# Patient Record
Sex: FEMALE | Race: WHITE | NOT HISPANIC OR LATINO | Employment: FULL TIME | URBAN - METROPOLITAN AREA
[De-identification: names, ages, dates, MRNs, and addresses within clinical notes are randomized per-mention and may not be internally consistent; named-entity substitution may affect disease eponyms.]

---

## 2022-10-03 ENCOUNTER — APPOINTMENT (OUTPATIENT)
Dept: CT IMAGING | Facility: CLINIC | Age: 22
End: 2022-10-03
Attending: EMERGENCY MEDICINE
Payer: COMMERCIAL

## 2022-10-03 ENCOUNTER — HOSPITAL ENCOUNTER (EMERGENCY)
Facility: CLINIC | Age: 22
Discharge: HOME | End: 2022-10-03
Attending: NURSE PRACTITIONER | Admitting: NURSE PRACTITIONER
Payer: COMMERCIAL

## 2022-10-03 VITALS
HEART RATE: 75 BPM | WEIGHT: 176 LBS | SYSTOLIC BLOOD PRESSURE: 151 MMHG | RESPIRATION RATE: 16 BRPM | OXYGEN SATURATION: 100 % | HEIGHT: 65 IN | DIASTOLIC BLOOD PRESSURE: 80 MMHG | BODY MASS INDEX: 29.32 KG/M2 | TEMPERATURE: 99 F

## 2022-10-03 DIAGNOSIS — J02.9 PHARYNGITIS: ICD-10-CM

## 2022-10-03 LAB
ALBUMIN SERPL-MCNC: 2.8 G/DL (ref 3.4–5)
ALP SERPL-CCNC: 55 U/L (ref 40–150)
ALT SERPL W P-5'-P-CCNC: 18 U/L (ref 0–50)
ANION GAP SERPL CALCULATED.3IONS-SCNC: 5 MMOL/L (ref 3–14)
AST SERPL W P-5'-P-CCNC: 12 U/L (ref 0–45)
BASOPHILS # BLD AUTO: 0 10E3/UL (ref 0–0.2)
BASOPHILS NFR BLD AUTO: 0 %
BILIRUB SERPL-MCNC: 0.3 MG/DL (ref 0.2–1.3)
BUN SERPL-MCNC: 10 MG/DL (ref 7–30)
CALCIUM SERPL-MCNC: 8.4 MG/DL (ref 8.5–10.1)
CHLORIDE BLD-SCNC: 110 MMOL/L (ref 94–109)
CO2 SERPL-SCNC: 25 MMOL/L (ref 20–32)
CREAT SERPL-MCNC: 0.56 MG/DL (ref 0.52–1.04)
DEPRECATED S PYO AG THROAT QL EIA: NEGATIVE
EOSINOPHIL # BLD AUTO: 0.2 10E3/UL (ref 0–0.7)
EOSINOPHIL NFR BLD AUTO: 2 %
ERYTHROCYTE [DISTWIDTH] IN BLOOD BY AUTOMATED COUNT: 12.9 % (ref 10–15)
FLUAV RNA SPEC QL NAA+PROBE: NEGATIVE
FLUBV RNA RESP QL NAA+PROBE: NEGATIVE
GFR SERPL CREATININE-BSD FRML MDRD: >90 ML/MIN/1.73M2
GLUCOSE BLD-MCNC: 73 MG/DL (ref 70–99)
GROUP A STREP BY PCR: NOT DETECTED
HCT VFR BLD AUTO: 41 % (ref 35–47)
HGB BLD-MCNC: 13 G/DL (ref 11.7–15.7)
IMM GRANULOCYTES # BLD: 0.1 10E3/UL
IMM GRANULOCYTES NFR BLD: 1 %
LYMPHOCYTES # BLD AUTO: 2.3 10E3/UL (ref 0.8–5.3)
LYMPHOCYTES NFR BLD AUTO: 22 %
MCH RBC QN AUTO: 28.1 PG (ref 26.5–33)
MCHC RBC AUTO-ENTMCNC: 31.7 G/DL (ref 31.5–36.5)
MCV RBC AUTO: 89 FL (ref 78–100)
MONOCYTES # BLD AUTO: 0.9 10E3/UL (ref 0–1.3)
MONOCYTES NFR BLD AUTO: 8 %
MONOCYTES NFR BLD AUTO: NEGATIVE %
NEUTROPHILS # BLD AUTO: 7.1 10E3/UL (ref 1.6–8.3)
NEUTROPHILS NFR BLD AUTO: 67 %
NRBC # BLD AUTO: 0 10E3/UL
NRBC BLD AUTO-RTO: 0 /100
PLATELET # BLD AUTO: 297 10E3/UL (ref 150–450)
POTASSIUM BLD-SCNC: 3.4 MMOL/L (ref 3.4–5.3)
PROT SERPL-MCNC: 6.5 G/DL (ref 6.8–8.8)
RBC # BLD AUTO: 4.63 10E6/UL (ref 3.8–5.2)
RSV RNA SPEC NAA+PROBE: NEGATIVE
SARS-COV-2 RNA RESP QL NAA+PROBE: NEGATIVE
SODIUM SERPL-SCNC: 140 MMOL/L (ref 133–144)
WBC # BLD AUTO: 10.5 10E3/UL (ref 4–11)

## 2022-10-03 PROCEDURE — C9803 HOPD COVID-19 SPEC COLLECT: HCPCS

## 2022-10-03 PROCEDURE — 250N000011 HC RX IP 250 OP 636: Performed by: NURSE PRACTITIONER

## 2022-10-03 PROCEDURE — 70491 CT SOFT TISSUE NECK W/DYE: CPT

## 2022-10-03 PROCEDURE — 87651 STREP A DNA AMP PROBE: CPT | Performed by: EMERGENCY MEDICINE

## 2022-10-03 PROCEDURE — 36415 COLL VENOUS BLD VENIPUNCTURE: CPT | Performed by: EMERGENCY MEDICINE

## 2022-10-03 PROCEDURE — 85004 AUTOMATED DIFF WBC COUNT: CPT | Performed by: EMERGENCY MEDICINE

## 2022-10-03 PROCEDURE — 99285 EMERGENCY DEPT VISIT HI MDM: CPT | Mod: 25

## 2022-10-03 PROCEDURE — 250N000009 HC RX 250: Performed by: NURSE PRACTITIONER

## 2022-10-03 PROCEDURE — 80053 COMPREHEN METABOLIC PANEL: CPT | Performed by: EMERGENCY MEDICINE

## 2022-10-03 PROCEDURE — 96361 HYDRATE IV INFUSION ADD-ON: CPT

## 2022-10-03 PROCEDURE — 87637 SARSCOV2&INF A&B&RSV AMP PRB: CPT | Performed by: EMERGENCY MEDICINE

## 2022-10-03 PROCEDURE — 96360 HYDRATION IV INFUSION INIT: CPT | Mod: 59

## 2022-10-03 PROCEDURE — 258N000003 HC RX IP 258 OP 636: Performed by: EMERGENCY MEDICINE

## 2022-10-03 PROCEDURE — 86308 HETEROPHILE ANTIBODY SCREEN: CPT | Performed by: EMERGENCY MEDICINE

## 2022-10-03 RX ORDER — SODIUM CHLORIDE 9 MG/ML
INJECTION, SOLUTION INTRAVENOUS CONTINUOUS
Status: DISCONTINUED | OUTPATIENT
Start: 2022-10-03 | End: 2022-10-03 | Stop reason: HOSPADM

## 2022-10-03 RX ORDER — IOPAMIDOL 755 MG/ML
100 INJECTION, SOLUTION INTRAVASCULAR ONCE
Status: COMPLETED | OUTPATIENT
Start: 2022-10-03 | End: 2022-10-03

## 2022-10-03 RX ADMIN — IOPAMIDOL 100 ML: 755 INJECTION, SOLUTION INTRAVENOUS at 18:18

## 2022-10-03 RX ADMIN — SODIUM CHLORIDE 1000 ML: 9 INJECTION, SOLUTION INTRAVENOUS at 16:02

## 2022-10-03 RX ADMIN — SODIUM CHLORIDE 60 ML: 900 INJECTION INTRAVENOUS at 18:18

## 2022-10-03 RX ADMIN — ORAL VEHICLES - SUSP 10 MG: SUSPENSION at 21:03

## 2022-10-03 ASSESSMENT — ENCOUNTER SYMPTOMS
WOUND: 0
CONFUSION: 0
FEVER: 1
MYALGIAS: 0
DIAPHORESIS: 0
VOICE CHANGE: 0
NECK STIFFNESS: 0
SHORTNESS OF BREATH: 0
WEAKNESS: 0
CHILLS: 0
FATIGUE: 1
DIZZINESS: 0
ARTHRALGIAS: 0
PHOTOPHOBIA: 0
SORE THROAT: 1
NAUSEA: 0
TROUBLE SWALLOWING: 0
HEADACHES: 0

## 2022-10-03 ASSESSMENT — ACTIVITIES OF DAILY LIVING (ADL)
ADLS_ACUITY_SCORE: 35
ADLS_ACUITY_SCORE: 35

## 2022-10-03 NOTE — ED PROVIDER NOTES
Rapid Assessment Note    History:   Lakshmi Goldberg is a 22 year old female who presents with mother for concerns of sore throat.  She has had on and off symptoms since August.  She notes initial work-up included a CT, mono, strep, COVID and was treated with dexamethasone.  Few weeks later it returned and was given 10 days of Augmentin with really no change in symptoms.  She was given dexamethasone x1 at that time.  Third visit she was given prednisone for 5 days.  This last Wednesday she was seen by ENT Dr. Cece LEIVA AN ENT from [facility] and was seen in the office on Friday and thought there was an abscess and attempted to drain it in the office with topical lidocaine and 3 passes with needle and a 11 blade presumably without any purulence.  She was placed on clindamycin 300mg 3 times a day without help.  She has had a ranging from 100-1 03 on Thursday Friday and Saturday and does not feel any better with the clindamycin.    Exam:   General:  Alert, interactive  Cardiovascular:  Well perfused  Lungs:  No respiratory distress, no accessory muscle use  Neuro:  Moving all 4 extremities  Skin:  Warm, dry  Psych:  Normal affect      Plan of Care:   I evaluated the patient and developed an initial plan of care. I discussed this plan and explained that I, or one of my partners, would be returning to complete the evaluation.     Seen in triage by myself Dr. Pedro Duffy MD    10/3/2022  [facility]    Portions of this medical record were completed by a scribe. UPON MY REVIEW AND AUTHENTICATION BY ELECTRONIC SIGNATURE, this confirms (a) I performed the applicable clinical services, and (b) the record is accurate.        Pedro Duffy MD  10/03/22 7516

## 2022-10-03 NOTE — ED TRIAGE NOTES
Pt reports having a long hx of a sore throat. Pt met with a ENT last week and was tested for strep, covid, and flu. All were negative. Pt note developing a worsening sore throat over the weekend.      Triage Assessment     Row Name 10/03/22 1536       Triage Assessment (Adult)    Airway WDL X;airway symptoms  difficulty swallowing. sore throat       Respiratory WDL    Respiratory WDL WDL       Skin Circulation/Temperature WDL    Skin Circulation/Temperature WDL WDL       Cardiac WDL    Cardiac WDL WDL       Peripheral/Neurovascular WDL    Peripheral Neurovascular WDL WDL       Cognitive/Neuro/Behavioral WDL    Cognitive/Neuro/Behavioral WDL WDL

## 2022-10-03 NOTE — ED PROVIDER NOTES
History     Chief Complaint:  Pharyngitis       HPI   Lakshmi Goldberg is a 22 year old female who presents with her mother for evaluation of a sore throat.  The patient initially had onset of symptoms in August.  She had work-up including a CT, lab testing including mono, strep, COVID which returned negative with exception of CT which showed tonsillitis with bilateral reactive adenopathy and she was treated with dexamethasone.  Symptoms did not fully resolve and she was treated with Augmentin and was also given another dose of dexamethasonewith no improvement on 9/13.  She was started on prednisone on 9/23/22 and notes the steroids are the only medications improving her symptoms.  On 9/30/2022 patient was seen by ENT with an attempt to drain a abscess without purulence.  She was started on clindamycin 3 times daily which has not improved symptoms.  She has had temperatures from 100-100.3 over the weekend.  She was instructed to come to the ED if her symptoms worsened.    Lakshmi Goldberg is a 22 year old female who presents with mother for concerns of sore throat.  She has had on and off symptoms since August.  She notes initial work-up included a CT, mono, strep, COVID and was treated with dexamethasone.  Few weeks later it returned and was given 10 days of Augmentin with really no change in symptoms.  She was given dexamethasone x1 at that time.  Third visit she was given prednisone for 5 days.  This last Wednesday she was seen by ENT Dr. Cece LEIVA AN ENT from [facility] and was seen in the office on Friday and thought there was an abscess and attempted to drain it in the office with topical lidocaine and 3 passes with needle and a 11 blade presumably without any purulence.  She was placed on clindamycin 300mg 3 times a day without help.  She has had a ranging from 100-1 03 on Thursday Friday and Saturday and does not feel any better with the clindamycin.  Patient does note intermittent fevers and  "odynophagia, but denies dysphagia, rash, headache, neck pain, chest pain, shortness of breath.     ROS:  Review of Systems   Constitutional: Positive for fatigue and fever. Negative for chills and diaphoresis.   HENT: Positive for sore throat. Negative for congestion, ear pain, tinnitus, trouble swallowing and voice change.    Eyes: Negative for photophobia and visual disturbance.   Respiratory: Negative for shortness of breath.    Cardiovascular: Negative for chest pain.   Gastrointestinal: Negative for nausea.   Musculoskeletal: Negative for arthralgias, myalgias and neck stiffness.   Skin: Negative for rash and wound.   Neurological: Negative for dizziness, syncope, weakness and headaches.   Psychiatric/Behavioral: Negative for confusion.     Allergies:  No Known Allergies     Medications:    Clindamycin    Past Medical History:    No past medical history on file.    Past Surgical History:    No past surgical history on file.     Family History:    family history is not on file.    Social History:  She works as a senior living active life coordinator  Patient presents with her mother.  PCP: System, Provider Not In     Physical Exam     Patient Vitals for the past 24 hrs:   BP Temp Temp src Pulse Resp SpO2 Height Weight   10/03/22 1536 (!) 151/80 99  F (37.2  C) Temporal 75 16 100 % 1.638 m (5' 4.5\") 79.8 kg (176 lb)   10/03/22 1535 (!) 180/95 97.7  F (36.5  C) Temporal 95 20 98 % 1.626 m (5' 4\") 79.8 kg (176 lb)        Physical Exam  Nursing notes reviewed. Vitals reviewed.  General: Alert. Well kept.  Eyes:  Conjunctiva non-injected, non-icteric.  Neck/Throat: Moist mucous membranes.  Healing I&D site left tonsil.  Right tonsil with tonsillar stone.  Tonsils 2+, uvula midline.  No trismus.  No sublingual swelling or tenderness.  Anterior cervical adenopathy bilateral.  Normal voice.  Cardiac: Regular rhythm. Normal heart sounds with no murmur/rubs/click.   Pulmonary: Clear and equal breath sounds bilaterally. No " crackles/rales. No wheezing  Musculoskeletal: Normal gross range of motion of all 4 extremities.    Neurological: Alert and oriented x4.   Skin: Warm and dry without rashes or petechiae. Normal appearance of visualized exposed skin.  Psych: Affect normal. Good eye contact.    Emergency Department Course     Imaging:  Soft tissue neck CT w contrast   Final Result   IMPRESSION:    1.  A few borderline enlarged level 2 cervical lymph nodes, likely reactive.   2.  Otherwise unremarkable CT soft tissue neck.         Report per radiology    Laboratory:  Labs Ordered and Resulted from Time of ED Arrival to Time of ED Departure   COMPREHENSIVE METABOLIC PANEL - Abnormal       Result Value    Sodium 140      Potassium 3.4      Chloride 110 (*)     Carbon Dioxide (CO2) 25      Anion Gap 5      Urea Nitrogen 10      Creatinine 0.56      Calcium 8.4 (*)     Glucose 73      Alkaline Phosphatase 55      AST 12      ALT 18      Protein Total 6.5 (*)     Albumin 2.8 (*)     Bilirubin Total 0.3      GFR Estimate >90     MONONUCLEOSIS SCREEN - Normal    Mononucleosis Screen Negative     INFLUENZA A/B & SARS-COV2 PCR MULTIPLEX - Normal    Influenza A PCR Negative      Influenza B PCR Negative      RSV PCR Negative      SARS CoV2 PCR Negative     STREPTOCOCCUS A RAPID SCREEN W REFELX TO PCR - Normal    Group A Strep antigen Negative     GROUP A STREPTOCOCCUS PCR THROAT SWAB - Normal    Group A strep by PCR Not Detected     CBC WITH PLATELETS AND DIFFERENTIAL    WBC Count 10.5      RBC Count 4.63      Hemoglobin 13.0      Hematocrit 41.0      MCV 89      MCH 28.1      MCHC 31.7      RDW 12.9      Platelet Count 297      % Neutrophils 67      % Lymphocytes 22      % Monocytes 8      % Eosinophils 2      % Basophils 0      % Immature Granulocytes 1      NRBCs per 100 WBC 0      Absolute Neutrophils 7.1      Absolute Lymphocytes 2.3      Absolute Monocytes 0.9      Absolute Eosinophils 0.2      Absolute Basophils 0.0      Absolute Immature  Granulocytes 0.1      Absolute NRBCs 0.0        Emergency Department Course:    Reviewed:  I reviewed nursing notes, vitals, past medical history and Care Everywhere    Assessments:  1755 I obtained history and examined the patient as noted above.   2015 I rechecked the patient and explained findings.     Interventions:  Medications   0.9% sodium chloride BOLUS (0 mLs Intravenous Stopped 10/3/22 2023)   iopamidol (ISOVUE-370) solution 100 mL (100 mLs Intravenous Given 10/3/22 1818)   Saline Flush (60 mLs Intravenous Given 10/3/22 1818)   dexamethasone (DECADRON) alcohol-free oral solution 10 mg (10 mg Oral Given 10/3/22 2103)      Disposition:  The patient was discharged to home.     Impression & Plan    Medical Decision Making:  Lakshmi Goldberg is a 22 year old female who presents with her mother for evaluation of a sore throat.  Work-up today shows a negative strep, influenza, RSV, COVID.  Mono testing also negative.  Her leukocytosis has resolved with a normal WBC of 10.5 today and she is afebrile.  Her hepatic panel is normal.  She has no abdominal pain or splenomegaly.  Due to her ongoing symptoms, in shared decision-making, we elected to proceed with CT imaging which returned negative for abscess or other acute process but did show reactive adenopathy.  These findings were discussed with the patient and her mother. There is no clinical evidence of peritonsillar abscess, retropharyngeal abscess, Lemierre's Syndrome, epiglottis, or Mark's angina.  I did discuss performing chlamydia or gonorrhea throat testing.  In consultation with lab I was unable to obtain these swabs.  Patient will follow-up with her gynecologist or ENT for this testing.  She has no oral rash and exam and history are not consistent with herpangina or monkey pox. There is no clinical evidence of peritonsillar abscess, retropharyngeal abscess, Lemierre's Syndrome, epiglottis, or Mark's angina.  The patient will follow-up with her  otolaryngologist and primary care.  She will use ibuprofen and/or acetaminophen for pain.  She will return with any difficulty swallowing, change in voice, and sublingual swelling or pain, concerns.    Diagnosis:    ICD-10-CM    1. Pharyngitis  J02.9         Discharge Medications:  There are no discharge medications for this patient.       10/3/2022   ProtivinZeny ferro, Zeny Gonzalez, CNP  10/03/22 0626

## 2024-05-12 ENCOUNTER — HOSPITAL ENCOUNTER (EMERGENCY)
Facility: CLINIC | Age: 24
Discharge: HOME | End: 2024-05-12
Attending: EMERGENCY MEDICINE | Admitting: EMERGENCY MEDICINE
Payer: COMMERCIAL

## 2024-05-12 VITALS
SYSTOLIC BLOOD PRESSURE: 122 MMHG | HEART RATE: 59 BPM | RESPIRATION RATE: 16 BRPM | TEMPERATURE: 98.5 F | BODY MASS INDEX: 30.73 KG/M2 | OXYGEN SATURATION: 100 % | WEIGHT: 180 LBS | DIASTOLIC BLOOD PRESSURE: 74 MMHG | HEIGHT: 64 IN

## 2024-05-12 DIAGNOSIS — M54.42 ACUTE BILATERAL LOW BACK PAIN WITH BILATERAL SCIATICA: ICD-10-CM

## 2024-05-12 DIAGNOSIS — M54.41 ACUTE BILATERAL LOW BACK PAIN WITH BILATERAL SCIATICA: ICD-10-CM

## 2024-05-12 PROCEDURE — 99284 EMERGENCY DEPT VISIT MOD MDM: CPT

## 2024-05-12 PROCEDURE — 96372 THER/PROPH/DIAG INJ SC/IM: CPT | Performed by: EMERGENCY MEDICINE

## 2024-05-12 PROCEDURE — 250N000013 HC RX MED GY IP 250 OP 250 PS 637: Performed by: EMERGENCY MEDICINE

## 2024-05-12 PROCEDURE — 250N000011 HC RX IP 250 OP 636: Performed by: EMERGENCY MEDICINE

## 2024-05-12 RX ORDER — KETOROLAC TROMETHAMINE 15 MG/ML
15 INJECTION, SOLUTION INTRAMUSCULAR; INTRAVENOUS ONCE
Status: COMPLETED | OUTPATIENT
Start: 2024-05-12 | End: 2024-05-12

## 2024-05-12 RX ORDER — LIDOCAINE 4 G/G
1 PATCH TOPICAL EVERY 24 HOURS
Qty: 6 PATCH | Refills: 0 | Status: SHIPPED | OUTPATIENT
Start: 2024-05-12

## 2024-05-12 RX ORDER — HYDROCODONE BITARTRATE AND ACETAMINOPHEN 5; 325 MG/1; MG/1
1 TABLET ORAL ONCE
Status: COMPLETED | OUTPATIENT
Start: 2024-05-12 | End: 2024-05-12

## 2024-05-12 RX ORDER — METHOCARBAMOL 500 MG/1
500 TABLET, FILM COATED ORAL ONCE
Status: COMPLETED | OUTPATIENT
Start: 2024-05-12 | End: 2024-05-12

## 2024-05-12 RX ORDER — METHOCARBAMOL 500 MG/1
500 TABLET, FILM COATED ORAL 4 TIMES DAILY PRN
Qty: 15 TABLET | Refills: 0 | Status: SHIPPED | OUTPATIENT
Start: 2024-05-12

## 2024-05-12 RX ORDER — GABAPENTIN 300 MG/1
300 CAPSULE ORAL 3 TIMES DAILY
Qty: 12 CAPSULE | Refills: 0 | Status: SHIPPED | OUTPATIENT
Start: 2024-05-12

## 2024-05-12 RX ORDER — GABAPENTIN 300 MG/1
300 CAPSULE ORAL ONCE
Status: COMPLETED | OUTPATIENT
Start: 2024-05-12 | End: 2024-05-12

## 2024-05-12 RX ORDER — LIDOCAINE 4 G/G
1 PATCH TOPICAL ONCE
Status: DISCONTINUED | OUTPATIENT
Start: 2024-05-12 | End: 2024-05-12 | Stop reason: HOSPADM

## 2024-05-12 RX ADMIN — LIDOCAINE 1 PATCH: 4 PATCH TOPICAL at 14:06

## 2024-05-12 RX ADMIN — KETOROLAC TROMETHAMINE 15 MG: 15 INJECTION, SOLUTION INTRAMUSCULAR; INTRAVENOUS at 12:41

## 2024-05-12 RX ADMIN — GABAPENTIN 300 MG: 300 CAPSULE ORAL at 14:05

## 2024-05-12 RX ADMIN — METHOCARBAMOL 500 MG: 500 TABLET ORAL at 12:39

## 2024-05-12 RX ADMIN — HYDROCODONE BITARTRATE AND ACETAMINOPHEN 1 TABLET: 5; 325 TABLET ORAL at 12:39

## 2024-05-12 ASSESSMENT — ACTIVITIES OF DAILY LIVING (ADL)
ADLS_ACUITY_SCORE: 35

## 2024-05-12 ASSESSMENT — COLUMBIA-SUICIDE SEVERITY RATING SCALE - C-SSRS
6. HAVE YOU EVER DONE ANYTHING, STARTED TO DO ANYTHING, OR PREPARED TO DO ANYTHING TO END YOUR LIFE?: NO
1. IN THE PAST MONTH, HAVE YOU WISHED YOU WERE DEAD OR WISHED YOU COULD GO TO SLEEP AND NOT WAKE UP?: NO
2. HAVE YOU ACTUALLY HAD ANY THOUGHTS OF KILLING YOURSELF IN THE PAST MONTH?: NO

## 2024-05-12 NOTE — ED TRIAGE NOTES
Pt took plane from [place] yesterday and sat on flight for 7 hours 45 minutes with back pain. Today lower back pain shooting down leg and unable to bear weight on left leg at times. Pt denies incontinence.     Triage Assessment (Adult)       Row Name 05/12/24 1056          Triage Assessment    Airway WDL WDL        Respiratory WDL    Respiratory WDL WDL        Skin Circulation/Temperature WDL    Skin Circulation/Temperature WDL WDL        Cardiac WDL    Cardiac WDL WDL        Peripheral/Neurovascular WDL    Peripheral Neurovascular WDL WDL        Cognitive/Neuro/Behavioral WDL    Cognitive/Neuro/Behavioral WDL WDL

## 2024-05-12 NOTE — ED PROVIDER NOTES
"  Emergency Department Note      History of Present Illness     Chief Complaint  Back Pain and Extremity Weakness    HPI  Lakshmi Goldberg is a 24 year old female with back pain and extremity weakness. She was on a 7 hour and 45 minute flight from [place] to [place] yesterday, and had some back pain during it. Then earlier today, she was unable to get up due to the severity of the pain. She describes it as a \"pinch or twinge\", and notes that it will sometimes radiate down both legs. She denies recent falls or injuries. She further denies numbness, fever, urinary/fecal incontinence, saddle paraesthesia, or dysuria.     Independent Historian  None    Review of External Notes  None    Past Medical History   Medical History and Problem List  R tubal pregnancy   Major depressive disorder   Migraine w/ aura   Adjustment disorder   Anxiety      Medications  Aldactone   Buspar   Wellbutrin     Surgical History   Unspecified tympanoplasty w/ tube placement     Physical Exam   Patient Vitals for the past 24 hrs:   BP Temp Temp src Pulse Resp SpO2 Height Weight   05/12/24 1050 (!) 151/80 98.5  F (36.9  C) Oral 73 18 99 % 1.626 m (5' 4\") 81.6 kg (180 lb)     Physical Exam    General: Well-nourished, nontoxic in appearance  Eyes: Pupils equal, conjunctivae pink no scleral icterus or conjunctival injection  ENT:  Moist mucus membranes  Respiratory:  Lungs clear to auscultation bilaterally, no crackles/rubs/wheezes.  Good air movement  CV: Normal rate and rhythm, no murmurs  GI:  Abdomen soft and non-distended.  No tenderness, guarding or rebound  Skin: Warm, dry.  No redness, rashes, or petechiae.  Musculoskeletal: No peripheral edema or calf tenderness.  No midline tenderness of the spine.  Mild tenderness to palpation over the bilateral SI joints.  Neuro: Alert and oriented to person/place/time.  No saddle anesthesia.  Sensation intact in the upper and lower extremities.  5 out of 5 muscle strength in the upper and lower " extremities.  Stable gait.  Psychiatric: Normal affect     Diagnostics   Lab Results   Labs Ordered and Resulted from Time of ED Arrival to Time of ED Departure - No data to display    Imaging  No orders to display     Independent Interpretation  None    ED Course    Medications Administered  Medications   Lidocaine (LIDOCARE) 4 % Patch 1 patch (has no administration in time range)   gabapentin (NEURONTIN) capsule 300 mg (has no administration in time range)   ketorolac (TORADOL) injection 15 mg (15 mg Intramuscular $Given 5/12/24 1241)   methocarbamol (ROBAXIN) tablet 500 mg (500 mg Oral $Given 5/12/24 1239)   HYDROcodone-acetaminophen (NORCO) 5-325 MG per tablet 1 tablet (1 tablet Oral $Given 5/12/24 1239)     Procedures  None     Discussion of Management  None    Social Determinants of Health adding to complexity of care  None    ED Course  ED Course as of 05/12/24 1349   Sun May 12, 2024   1201 I obtained history and examined the patient as noted above.   1338 I rechecked the patient and updated.     Medical Decision Making / Diagnosis   CMS Diagnoses: None    MIPS     None    [facility]  Lakshmi Goldberg is a 24 year old female presents emergency department with a complaint of bilateral low back pain that radiates down the back of both legs.  Patient reports that when she has a lot of pain, she finds it difficult to move around.  She is not having any loss of sensation or weakness in her legs.  She is not having any loss of bowel or bladder control, numbness in her groin, fevers, and she has not had any trauma.  Patient does report that she sat on an airplane for about 8 hours on a flight and that is when her pain started.  On exam patient is laying flat on her back.  She is able to bend her knees up to her stomach and roll to the side for examination.  He is also able to lift both legs off of the bed.  No sensation changes in her lower extremities.  No signs of ischemia.  No midline tenderness.  No red flag  signs.  This is nontraumatic back pain, and I do not think that she needs any advanced imaging today.  We will try to get her symptoms slightly under control and then prescribe her medicines for home.  On reevaluation, the patient is feeling better when she is laying down, but is having a lot of pain when she tries to stand up.  She states that it feels like a muscle spasm in her back takes hold.  I will give her some gabapentin and a lidocaine patch.  Patient is feeling better after the gabapentin.  I will prescribe muscle relaxer and gabapentin for home.  Patient is able to stand up and take a couple steps.  She has plenty of help at home.  Patient is discharged home.    Disposition  The patient was discharged.     ICD-10 Codes:    ICD-10-CM    1. Acute bilateral low back pain with bilateral sciatica  M54.42     M54.41            Discharge Medications  New Prescriptions    No medications on file       Scribe Disclosure:  I, Kevin Lozano, am serving as a scribe at 12:07 PM on 5/12/2024 to document services personally performed by Kerri Rojo MD based on my observations and the provider's statements to me.     [employer]      Kerri Rojo MD  05/12/24 5411

## 2024-05-12 NOTE — ED NOTES
"Pt reports lower back pain previously 7/10 for which she received Norco, Toradol, and Robaxin. Pt states \"the medicine hardly did anything\" and currently rates her pain 6/10, saying she is still unable to stand. Pt given Gabapentin and a lidocaine patch to lower back. Family supportive at bedside.  "